# Patient Record
Sex: FEMALE | URBAN - METROPOLITAN AREA
[De-identification: names, ages, dates, MRNs, and addresses within clinical notes are randomized per-mention and may not be internally consistent; named-entity substitution may affect disease eponyms.]

---

## 2018-07-26 ENCOUNTER — APPOINTMENT (RX ONLY)
Dept: URBAN - METROPOLITAN AREA CLINIC 13 | Facility: CLINIC | Age: 53
Setting detail: DERMATOLOGY
End: 2018-07-26

## 2018-07-26 DIAGNOSIS — D18.0 HEMANGIOMA: ICD-10-CM

## 2018-07-26 PROBLEM — D18.01 HEMANGIOMA OF SKIN AND SUBCUTANEOUS TISSUE: Status: ACTIVE | Noted: 2018-07-26

## 2018-07-26 PROBLEM — Z85.828 PERSONAL HISTORY OF OTHER MALIGNANT NEOPLASM OF SKIN: Status: ACTIVE | Noted: 2018-07-26

## 2018-07-26 PROCEDURE — ? DEFER

## 2018-07-26 PROCEDURE — ? OBSERVATION

## 2018-07-26 PROCEDURE — ? OTHER

## 2018-07-26 PROCEDURE — 99202 OFFICE O/P NEW SF 15 MIN: CPT

## 2018-07-26 PROCEDURE — ? OBSERVATION AND MEASURE

## 2018-07-26 ASSESSMENT — LOCATION SIMPLE DESCRIPTION DERM
LOCATION SIMPLE: NOSE
LOCATION SIMPLE: RIGHT FOREHEAD

## 2018-07-26 ASSESSMENT — LOCATION ZONE DERM
LOCATION ZONE: FACE
LOCATION ZONE: NOSE

## 2018-07-26 ASSESSMENT — LOCATION DETAILED DESCRIPTION DERM
LOCATION DETAILED: NASAL ROOT
LOCATION DETAILED: RIGHT INFERIOR FOREHEAD

## 2018-07-26 NOTE — PROCEDURE: DEFER
Detail Level: Detailed
Procedure To Be Performed At Next Visit: Excision
Other Procedure: electrodessication
Instructions (Optional): Patient quoted $262 for top layer sutures only. Patient informed that if deep sutures are also needed, price may rise to between $800 and $1500 depending on the size of the wound.

## 2018-07-26 NOTE — PROCEDURE: OTHER
Detail Level: Detailed
Other (Free Text): Patient was not quoted correctly for the type of procedure she would need for removal of a hemangioma and will need to make a separate appointment for this type of removal.  There should be no charge for this appointment.
Note Text (......Xxx Chief Complaint.): This diagnosis correlates with the